# Patient Record
Sex: MALE | Employment: OTHER
[De-identification: names, ages, dates, MRNs, and addresses within clinical notes are randomized per-mention and may not be internally consistent; named-entity substitution may affect disease eponyms.]

---

## 2022-01-01 ENCOUNTER — HOME CARE VISIT (OUTPATIENT)
Dept: HOME HEALTH SERVICES | Facility: CLINIC | Age: 87
End: 2022-01-01

## 2022-01-01 ENCOUNTER — HOME CARE VISIT (OUTPATIENT)
Dept: HOSPICE | Facility: HOSPICE | Age: 87
End: 2022-01-01

## 2022-01-01 ENCOUNTER — HOSPICE ADMISSION (OUTPATIENT)
Dept: HOSPICE | Facility: HOSPICE | Age: 87
End: 2022-01-01

## 2022-01-01 ENCOUNTER — TRANSCRIBE ORDERS (OUTPATIENT)
Dept: HOME HEALTH SERVICES | Facility: CLINIC | Age: 87
End: 2022-01-01

## 2022-01-01 VITALS
DIASTOLIC BLOOD PRESSURE: 60 MMHG | HEART RATE: 56 BPM | RESPIRATION RATE: 16 BRPM | TEMPERATURE: 97.9 F | SYSTOLIC BLOOD PRESSURE: 114 MMHG

## 2022-01-01 VITALS
HEART RATE: 88 BPM | RESPIRATION RATE: 14 BRPM | DIASTOLIC BLOOD PRESSURE: 80 MMHG | SYSTOLIC BLOOD PRESSURE: 130 MMHG | TEMPERATURE: 98.8 F

## 2022-01-01 VITALS — RESPIRATION RATE: 22 BRPM | HEART RATE: 104 BPM | TEMPERATURE: 97.4 F

## 2022-01-01 VITALS
TEMPERATURE: 97.9 F | HEART RATE: 78 BPM | DIASTOLIC BLOOD PRESSURE: 66 MMHG | RESPIRATION RATE: 18 BRPM | SYSTOLIC BLOOD PRESSURE: 102 MMHG

## 2022-01-01 DIAGNOSIS — I25.10 CORONARY ARTERY DISEASE, UNSPECIFIED VESSEL OR LESION TYPE, UNSPECIFIED WHETHER ANGINA PRESENT, UNSPECIFIED WHETHER NATIVE OR TRANSPLANTED HEART: Primary | ICD-10-CM

## 2022-01-01 PROCEDURE — 65100000000 HSPC ROUTINE CARE

## 2022-01-01 PROCEDURE — G0299 HHS/HOSPICE OF RN EA 15 MIN: HCPCS

## 2022-01-01 PROCEDURE — G0155 HHCP-SVS OF CSW,EA 15 MIN: HCPCS

## 2022-01-01 PROCEDURE — 6520001 HSPC ROUTINE CARE

## 2022-01-01 PROCEDURE — G0156 HHCP-SVS OF AIDE,EA 15 MIN: HCPCS

## 2022-10-19 NOTE — HOSPICE
Patient admitted to [facility] with diagnosis of: Coronary artery disease to Dr. Malik Scanlon.  Spouse reported recent hospitalization for broken hip then to rehab at local SNF but patient wouldn't participate in rehabilitation of hip to walk again.  Spouse stated patient complains of chest pain regularly treated by NTG and facility gave Tylenol that would eventually reduce the discomfort.  Spouse and Children reported the patients cardiologist referred them to Hospice because there are not any interventions for patients weak heart and chest pain.  Family also reports weight loss of over 20 lbs. over the last month as patient was eating very little at the SNF and has continued decline so that spouse wanted to get patient home and provide comfort care only.     Uncomfortable because of pain?  Patient was resting or eating at all observations during assessments but caregiver stated he c/o pain of his bottom r/t excoriation from incontinence.  Barrier creams and  treatments for the skin irritation had been applied prior to arrival and no complaints from patient during visit.  Pain medication and anxiety medication to help patient get comfortable were ordered by attending an picked up by family and given prior to assessment.  Patient slept through physical assessment.       CPR/DNR discussion occurred with family and patient has already been a DNR and they want to continue DNR status.     Hospitalization discussion occurred with spouse and family and they stated they would not return to hospital with patient as doctors have told them there are not any treatments available that would help because of his weak heart.     Other life Sustaining measures to include IV,s and/or IV antibiotic, tube feeding, dialysis, intubation, or any aggressive treatments discussed and family stated they do not want the patient put through any more treatments and they have been assured there are not any treatments that would  improve patients quality of life as he is losing weight, refusing to eat, refusing to participate in any therapy, and has recurrent chest pain with minimal activity and at rest.      If patient has history of dyspnea, describe (include severity).  Patient was not observed to have any dyspnea and family denied any history or respiratory distress but encouraged them to point a fan toward his face and use pain medication if complaint of chest pain or appears SOA with chest pain.  Family reported oxygen had been placed on him in hospital and he would remove the tubing as it bothered him more than it was noted to help so they do not want any oxygen in the home.     Bowel program description. Patient had not had issues of constipation until recently and has had multiple large bowel movements since but not taking any stool softeners or stimulants so agreed to miralax to be be started and to have bisacodyl available if not bowel movement in 3 days.    The  was present and Discussed Spiritual/Existential concerns with The patients son in law to get history of family and no concerns identified and later discussed with this SN and they denied any concerns and stated they get comfort from their beliefs and indy.     Instructed Patient and caregivers present that when new medications received to call Hospice to review contents before administration to ensure correct medication, dosage, and instructions understood prior to use of any new medications or when medications are not as expected per previous instructions.      Covid 19 prevention education completed      Admission booklet explained and instruction given on hospice availability and how to access nurse 24 hour/day

## 2022-10-20 NOTE — HOSPICE
Arrived at patient's home finding him comfortable with no needs notes at this time. Spouse and patient have been  61 with 3 adult children, Denise, Patrick, and Kelli. Kelli lives at home with patient and spouse but works during the day.     Patient had a fall in September causing a fracture in left hip with surgical procedure for feng placement. Patient hadn't walked since this accident. Recent attempt for physical therapy at [facility] but patient is unable to tolerate due to pain. Spouse and adult children brought patient home with the goal to keep patient comfortable.    Spouse and daughter repeated how grateful they are that patient is no longer suffering with the pain he had been enduring. Spouse reports a better night's rest. Spouse reports being tired herself and Signee encouraged spouse to rest when patient sleeps.     Self care plan discussed with Mrs. Ricardo and she acknowledged the importance of getting caught up on her rest and to take breaks outside on porch as needed. Signee educated on the importance of appropriate expression of emotions associated with anticipatory grief. Signee commended the family on their support of one another. Denise said that she and her brother and other family members will insure that mother will be cared for.     Signee educated daughter and spouse on Paramedic  DNR and spouse signed with Signee notarizing document.  Final arrangements will be at [facility] in [facility].    No MSW needs but spouse and daughter verbalize understanding to call Signee as needed and RN / as needed.

## 2022-10-20 NOTE — HOSPICE
Upon my arrival patient resting quietly with eyes closed.  Nuria (sp) relates she gave the Lorazepam & Norco last noc & patient rested extremly well.  We discussed need for incontinent care needs & education given reguarding 24/7 care needs.  Medications reconciled & agrees to call [facility] PRN

## 2022-10-20 NOTE — HOSPICE
The patient indicated his pain level to our nurse who was present. There were no visible fall risks. I will visit 1xs, monthly.    The patient's wife, the patient's daughter, the patietn's son-in-law, our nurse, and I discussed family history and the patient's disease process.    I offered understanding, empathy, and spiritual encouragement.

## 2022-10-22 NOTE — HOSPICE
met wife at the door. The patient refused to let me give him a bath said he wanted his wife to give him a bath, so they decided they didn't need me for now. I let nurse Toni WALL know what they wanted. Let them know if patient needs anything to call the office at anytime.

## 2022-10-27 NOTE — HOSPICE
Pt. in bed with TV on but not paying attention to program.  He answered questions yes and no but no conversation.  He did cooperate with assessment reaching to have b/p cuff placed and taking deep breaths as instructed.  Reviewed meds and needs more medication for constipation for daily use.  instructed and verified they had suppositories if needed.  Pt. denies pain and CG stated he hadn't complained.  Stated the Lorazepam liquid seems to make him anxious but pill didn't.  Instructed to try the Haldol to see if helps or worse.  Reviewed meds that needed refilled and will order from [facility].

## 2022-11-03 NOTE — HOSPICE
Upon my arrival pt resting quietly in bed with eyes closed.  Spouse & daughter relate symptom managment meds effective.  Review of s/s decline and anticipated care needs with time allowed for any questions.  Agrees to call [facility]

## 2022-11-04 NOTE — HOSPICE
The patient was not alert and oriented. There were no visible fall risks. I delivered supplies - briefs and chuxx.    The patient's spouse, the patient's daughter, and I talked about how they are coping with being the patient's full time caregivers.    I offered understanding, compassion, and a listening and spiritual presence.